# Patient Record
Sex: FEMALE | Race: ASIAN | NOT HISPANIC OR LATINO | URBAN - METROPOLITAN AREA
[De-identification: names, ages, dates, MRNs, and addresses within clinical notes are randomized per-mention and may not be internally consistent; named-entity substitution may affect disease eponyms.]

---

## 2017-08-07 ENCOUNTER — EMERGENCY (EMERGENCY)
Facility: HOSPITAL | Age: 57
LOS: 1 days | Discharge: HOME | End: 2017-08-07
Attending: EMERGENCY MEDICINE | Admitting: EMERGENCY MEDICINE
Payer: COMMERCIAL

## 2017-08-07 VITALS
DIASTOLIC BLOOD PRESSURE: 79 MMHG | SYSTOLIC BLOOD PRESSURE: 137 MMHG | HEART RATE: 85 BPM | TEMPERATURE: 99 F | RESPIRATION RATE: 18 BRPM | OXYGEN SATURATION: 98 %

## 2017-08-07 PROCEDURE — 96372 THER/PROPH/DIAG INJ SC/IM: CPT

## 2017-08-07 PROCEDURE — 99284 EMERGENCY DEPT VISIT MOD MDM: CPT

## 2017-08-07 PROCEDURE — 99283 EMERGENCY DEPT VISIT LOW MDM: CPT | Mod: 25

## 2017-08-07 RX ORDER — KETOROLAC TROMETHAMINE 30 MG/ML
15 SYRINGE (ML) INJECTION ONCE
Qty: 0 | Refills: 0 | Status: DISCONTINUED | OUTPATIENT
Start: 2017-08-07 | End: 2017-08-07

## 2017-08-07 RX ORDER — CYCLOBENZAPRINE HYDROCHLORIDE 10 MG/1
1 TABLET, FILM COATED ORAL
Qty: 21 | Refills: 0 | OUTPATIENT
Start: 2017-08-07 | End: 2017-08-14

## 2017-08-07 RX ADMIN — Medication 15 MILLIGRAM(S): at 22:38

## 2017-08-07 NOTE — ED PROVIDER NOTE - ATTENDING CONTRIBUTION TO CARE
I performed a history and physical exam of the patient and discussed their management with the resident. I reviewed the resident's note and agree with the documented findings and plan of care. My medical decision making and observations are found above.  Lungs clear. abd soft. no zoster lesions.

## 2017-08-07 NOTE — ED PROVIDER NOTE - NS ED ROS FT
ROS: denies weakness, dizziness, fevers/chills, nausea/vomiting, chest pain, SOB, diaphoresis, abdominal pain, diarrhea, neuro deficits, dysuria/hematuria, rash

## 2017-08-07 NOTE — ED ADULT NURSE NOTE - OBJECTIVE STATEMENT
Pt AXOX4, gross neuro intact, coming to ED c/o headache and neck stiffness.  Pt calm, cooperative.  PT denies dizziness, N/V, F/C, bleeding, dark stools, injury/falls.  Pt well appearing.  Will continue to monitor.

## 2017-08-07 NOTE — ED PROVIDER NOTE - MEDICAL DECISION MAKING DETAILS
Delio: hx of cervical stenosis. no trauma, no focal neuro changes. headache not a bleed or infection or mass. tx pain and d/c

## 2017-08-07 NOTE — ED PROVIDER NOTE - PHYSICAL EXAMINATION
Gen: Well appearing, NAD, in mild distress  Head: NCAT. no TTP on temples bilaterally  HEENT: PERRL, MMM, normal conjunctiva, anicteric, neck supple  Lung: CTAB, no adventitious sounds  CV: RRR, no murmurs, rubs or gallops  Abd: soft, NTND, no rebound or guarding, no CVAT  MSK: No edema, no visible deformities  Neuro: No focal neurologic deficits. CN II-XII grossly intact. 5/5 strength and normal sensation in all extremities.  Skin: Warm and dry, 3 puntate rash on L thigh; 1cm x 1cm mild erythema on L shoulder  Psych: normal mood and affect

## 2017-08-07 NOTE — ED PROVIDER NOTE - OBJECTIVE STATEMENT
57yo hx migraine and cervical stenosis presents with HA / neck pain. Started last night, but was able to sleep through the pain. Pain progressed to neck pain today. Does not have HA today. Describes HA as tightness around the head that has now moved on to the tightness to the neck. Has shooting electrical pain starting at her shoulders and traveling upto her occiput. denies recent travel. UTD on vaccinations. Associated with some edematous redness on L shoulder, that started 2 hours ago and is improving. Also has punctate rash that started last night on R leg that was itchy but is no longer itchy

## 2018-06-13 ENCOUNTER — EMERGENCY (EMERGENCY)
Facility: HOSPITAL | Age: 58
LOS: 1 days | Discharge: HOME | End: 2018-06-13
Attending: EMERGENCY MEDICINE
Payer: COMMERCIAL

## 2018-06-13 VITALS
HEART RATE: 67 BPM | WEIGHT: 115.96 LBS | SYSTOLIC BLOOD PRESSURE: 138 MMHG | OXYGEN SATURATION: 99 % | TEMPERATURE: 98 F | DIASTOLIC BLOOD PRESSURE: 86 MMHG | RESPIRATION RATE: 18 BRPM

## 2018-06-13 VITALS
SYSTOLIC BLOOD PRESSURE: 115 MMHG | OXYGEN SATURATION: 96 % | RESPIRATION RATE: 18 BRPM | HEART RATE: 68 BPM | DIASTOLIC BLOOD PRESSURE: 67 MMHG | TEMPERATURE: 98 F

## 2018-06-13 LAB
ALBUMIN SERPL ELPH-MCNC: 5 G/DL — SIGNIFICANT CHANGE UP (ref 3.3–5)
ALP SERPL-CCNC: 98 U/L — SIGNIFICANT CHANGE UP (ref 40–120)
ALT FLD-CCNC: 22 U/L — SIGNIFICANT CHANGE UP (ref 10–45)
ANION GAP SERPL CALC-SCNC: 17 MMOL/L — SIGNIFICANT CHANGE UP (ref 5–17)
APPEARANCE UR: CLEAR — SIGNIFICANT CHANGE UP
AST SERPL-CCNC: 29 U/L — SIGNIFICANT CHANGE UP (ref 10–40)
BACTERIA # UR AUTO: ABNORMAL /HPF
BASOPHILS # BLD AUTO: 0.1 K/UL — SIGNIFICANT CHANGE UP (ref 0–0.2)
BASOPHILS NFR BLD AUTO: 0.9 % — SIGNIFICANT CHANGE UP (ref 0–2)
BILIRUB SERPL-MCNC: 0.5 MG/DL — SIGNIFICANT CHANGE UP (ref 0.2–1.2)
BILIRUB UR-MCNC: NEGATIVE — SIGNIFICANT CHANGE UP
BUN SERPL-MCNC: 24 MG/DL — HIGH (ref 7–23)
CALCIUM SERPL-MCNC: 9.7 MG/DL — SIGNIFICANT CHANGE UP (ref 8.4–10.5)
CHLORIDE SERPL-SCNC: 94 MMOL/L — LOW (ref 96–108)
CO2 SERPL-SCNC: 25 MMOL/L — SIGNIFICANT CHANGE UP (ref 22–31)
COLOR SPEC: YELLOW — SIGNIFICANT CHANGE UP
CREAT SERPL-MCNC: 0.78 MG/DL — SIGNIFICANT CHANGE UP (ref 0.5–1.3)
DIFF PNL FLD: ABNORMAL
EOSINOPHIL # BLD AUTO: 0 K/UL — SIGNIFICANT CHANGE UP (ref 0–0.5)
EOSINOPHIL NFR BLD AUTO: 0.3 % — SIGNIFICANT CHANGE UP (ref 0–6)
EPI CELLS # UR: SIGNIFICANT CHANGE UP /HPF
GLUCOSE SERPL-MCNC: 106 MG/DL — HIGH (ref 70–99)
GLUCOSE UR QL: NEGATIVE — SIGNIFICANT CHANGE UP
HCT VFR BLD CALC: 45.4 % — HIGH (ref 34.5–45)
HGB BLD-MCNC: 15.8 G/DL — HIGH (ref 11.5–15.5)
KETONES UR-MCNC: ABNORMAL
LEUKOCYTE ESTERASE UR-ACNC: ABNORMAL
LYMPHOCYTES # BLD AUTO: 1.8 K/UL — SIGNIFICANT CHANGE UP (ref 1–3.3)
LYMPHOCYTES # BLD AUTO: 15.6 % — SIGNIFICANT CHANGE UP (ref 13–44)
MCHC RBC-ENTMCNC: 31.4 PG — SIGNIFICANT CHANGE UP (ref 27–34)
MCHC RBC-ENTMCNC: 34.8 GM/DL — SIGNIFICANT CHANGE UP (ref 32–36)
MCV RBC AUTO: 90.2 FL — SIGNIFICANT CHANGE UP (ref 80–100)
MONOCYTES # BLD AUTO: 0.8 K/UL — SIGNIFICANT CHANGE UP (ref 0–0.9)
MONOCYTES NFR BLD AUTO: 7.1 % — SIGNIFICANT CHANGE UP (ref 2–14)
NEUTROPHILS # BLD AUTO: 8.7 K/UL — HIGH (ref 1.8–7.4)
NEUTROPHILS NFR BLD AUTO: 76.1 % — SIGNIFICANT CHANGE UP (ref 43–77)
NITRITE UR-MCNC: NEGATIVE — SIGNIFICANT CHANGE UP
PH UR: 6 — SIGNIFICANT CHANGE UP (ref 5–8)
PLATELET # BLD AUTO: 273 K/UL — SIGNIFICANT CHANGE UP (ref 150–400)
POTASSIUM SERPL-MCNC: 4 MMOL/L — SIGNIFICANT CHANGE UP (ref 3.5–5.3)
POTASSIUM SERPL-SCNC: 4 MMOL/L — SIGNIFICANT CHANGE UP (ref 3.5–5.3)
PROT SERPL-MCNC: 8.7 G/DL — HIGH (ref 6–8.3)
PROT UR-MCNC: 30 MG/DL
RBC # BLD: 5.03 M/UL — SIGNIFICANT CHANGE UP (ref 3.8–5.2)
RBC # FLD: 11.5 % — SIGNIFICANT CHANGE UP (ref 10.3–14.5)
RBC CASTS # UR COMP ASSIST: SIGNIFICANT CHANGE UP /HPF (ref 0–2)
SODIUM SERPL-SCNC: 136 MMOL/L — SIGNIFICANT CHANGE UP (ref 135–145)
SP GR SPEC: 1.02 — SIGNIFICANT CHANGE UP (ref 1.01–1.02)
UROBILINOGEN FLD QL: NEGATIVE — SIGNIFICANT CHANGE UP
WBC # BLD: 11.4 K/UL — HIGH (ref 3.8–10.5)
WBC # FLD AUTO: 11.4 K/UL — HIGH (ref 3.8–10.5)
WBC UR QL: SIGNIFICANT CHANGE UP /HPF (ref 0–5)

## 2018-06-13 PROCEDURE — 93010 ELECTROCARDIOGRAM REPORT: CPT

## 2018-06-13 PROCEDURE — 81001 URINALYSIS AUTO W/SCOPE: CPT

## 2018-06-13 PROCEDURE — 83690 ASSAY OF LIPASE: CPT

## 2018-06-13 PROCEDURE — 99284 EMERGENCY DEPT VISIT MOD MDM: CPT | Mod: 25

## 2018-06-13 PROCEDURE — 96375 TX/PRO/DX INJ NEW DRUG ADDON: CPT

## 2018-06-13 PROCEDURE — 93005 ELECTROCARDIOGRAM TRACING: CPT

## 2018-06-13 PROCEDURE — 96374 THER/PROPH/DIAG INJ IV PUSH: CPT

## 2018-06-13 PROCEDURE — 85027 COMPLETE CBC AUTOMATED: CPT

## 2018-06-13 PROCEDURE — 80053 COMPREHEN METABOLIC PANEL: CPT

## 2018-06-13 RX ORDER — PROCHLORPERAZINE MALEATE 5 MG
10 TABLET ORAL ONCE
Qty: 0 | Refills: 0 | Status: COMPLETED | OUTPATIENT
Start: 2018-06-13 | End: 2018-06-13

## 2018-06-13 RX ORDER — SODIUM CHLORIDE 9 MG/ML
1000 INJECTION INTRAMUSCULAR; INTRAVENOUS; SUBCUTANEOUS ONCE
Qty: 0 | Refills: 0 | Status: COMPLETED | OUTPATIENT
Start: 2018-06-13 | End: 2018-06-13

## 2018-06-13 RX ORDER — METOCLOPRAMIDE HCL 10 MG
10 TABLET ORAL ONCE
Qty: 0 | Refills: 0 | Status: COMPLETED | OUTPATIENT
Start: 2018-06-13 | End: 2018-06-13

## 2018-06-13 RX ORDER — METOCLOPRAMIDE HCL 10 MG
5 TABLET ORAL ONCE
Qty: 0 | Refills: 0 | Status: DISCONTINUED | OUTPATIENT
Start: 2018-06-13 | End: 2018-06-13

## 2018-06-13 RX ORDER — ONDANSETRON 8 MG/1
1 TABLET, FILM COATED ORAL
Qty: 9 | Refills: 0 | OUTPATIENT
Start: 2018-06-13 | End: 2018-06-15

## 2018-06-13 RX ADMIN — Medication 104 MILLIGRAM(S): at 11:42

## 2018-06-13 RX ADMIN — SODIUM CHLORIDE 1000 MILLILITER(S): 9 INJECTION INTRAMUSCULAR; INTRAVENOUS; SUBCUTANEOUS at 09:42

## 2018-06-13 RX ADMIN — Medication 10 MILLIGRAM(S): at 09:42

## 2018-06-13 NOTE — ED ADULT NURSE REASSESSMENT NOTE - NS ED NURSE REASSESS COMMENT FT1
pt provided crackers and water for PO challenge; pharmacy contacted for medication to be sent for administration

## 2018-06-13 NOTE — ED PROVIDER NOTE - ATTENDING CONTRIBUTION TO CARE
Dr. Brandon : I have personally seen and examined this patient at the bedside. I have fully participated in the care of this patient. I have reviewed all pertinent clinical information, including history, physical exam, plan and the PA's note and agree except as noted.     58yo F PMhx HTN HLD migraines, anxiety, cervical stenosis, pw n/vx 2 days. states that had a dental cleaning on Monday, with topical anesthetic that she might of swallowed, after which felt nauseous and vomited x 5 (non bilious), noticed some blood tinged vomit after many episodes. unable to keep anything down has been hydrating but vomits water. afraid to eat after that. feels dehydrated a little lightheaded. +ha but notes feels like her migraine ha as unable to keep down her daily migrane meds. gradual onset.   Denies f/cp/sob/palpitations/cough/abd.pain/d/c/dysuria/hematuria. no sick contacts/recent travel. no raw food. notes that had coffee and donut sp dental clinic, no prostetic valves or hardware in the body.    PE:  head; atraumatic normocephalic  eyes: perrla  neck: supple  Heart: rrr s1s2 no murmur  lungs: ctab  abd: soft, nt nd + bs no rebound/guarding no cva ttp  le: no swelling no calf ttp  back: no midline cervical/thoracic/lumbar ttp  neuro: aao x 3 cn ii-xii intact    -->vomiting possible stomach lining irritation sp topical oral anesthetic; no other complaints or symptoms will check basic labs hydrate; reglan, ekg; po challenge--reassess

## 2018-06-13 NOTE — ED PROVIDER NOTE - OBJECTIVE STATEMENT
56 YO female PMhx HTN HLD migraines, anxiety, cervical stenosis, presents to ED c/o nausea/vomiting x 2 days. Pt states began after routine dental cleaning 2 days ago. Pt notes an hour after, felt nausea and vomiting, with 5 episodes of vomiting that day with the last episode with blood tinged vomit. Pt notes the following day, after taking HTN medication, pt experienced vomiting of fluids only and has been unable to keep down liquids or solids. Associated symptoms dizziness and chills last night. She notes headache is typical headache for her similar to previous headaches, usually resolved with fiuranol, but has not taken because of n/v. Headache located posterior head, gradual onset,  does not radiate, is not typically associated with n/v. Denies fever, photophobia, visual changes, first HA, worst HA, neck stiffness, significant trauma, neuro deficits, abdominal pain, constipation/diarrhea, recent travel, recent sick contacts, urinary symptoms. 58 YO female post menopausal PMhx HTN HLD migraines, anxiety, cervical stenosis, presents to ED c/o nausea/vomiting x 2 days. Pt states began after routine dental cleaning 2 days ago. Pt notes an hour after, felt nausea and vomiting, with 5 episodes of vomiting that day with the last episode with blood tinged vomit. Pt notes the following day, after taking HTN medication, pt experienced vomiting of fluids only and has been unable to keep down liquids or solids. Experiences waves of nausea, intermittent throughout day. Associated symptoms dizziness and chills last night. She notes headache is typical headache for her similar to previous headaches, gets daily headaches, usually resolved with fiuranol, but has not taken because of n/v. Headache located posterior head, gradual onset,  does not radiate, is not typically associated with n/v. Denies fever, photophobia, visual changes, first HA, worst HA, neck stiffness, significant trauma, neuro deficits, abdominal pain, constipation/diarrhea, recent travel, recent sick contacts, urinary symptoms.    PMD Addison Ruby

## 2018-06-13 NOTE — ED ADULT NURSE NOTE - OBJECTIVE STATEMENT
56 y/o female a+ox3, pmhx migraines, HTN, HLD, coming from home for headache with n/v x few days. Pt reports onset of generalized headache with progressively worsening nausea and vomiting with chills; pt unable to keep fluids and PO meds down as she would "vomit soon after". Pt confirms history of migraines but they "never come with nausea and vomiting." Pt denies visual changes, photophobia, neck pain or stiffness, fevers, recent falls or head trauma, chest pain or discomfort, difficulty breathing, abdominal pain, diarrhea, numbness or tingling to the extremities. IV established, labs obtained. Pt left in position of comfort, socks provided. Will reassess

## 2018-06-13 NOTE — ED PROVIDER NOTE - PROGRESS NOTE DETAILS
Stable. PT seen and reassessed.  Patient symptomatically improved with fluids, reglan and compazine.  Pt PO challenge with fluids and crackers, No episodes of nausea/vomiting in ED stay.  AAOX3, NAD, VSS.  Discussed test results w/ patient, given copy of results. UA unconvincing, Urine culture sent, pt not currently experiencing urinary symptoms. Patient verbalized understanding of hospital course and outpatient plans, has decisional making capacity.  Will follow-up with Primary care doctor in the next 5-7 days; patient will call for an appointment. Will return to the ED if there is any worsening of symptoms.  Patient able to ambulate w/o difficulty, is tolerating PO intake. -Purvi Cardenas PA-C Stable. PT seen and reassessed.  Patient symptomatically improved with fluids, reglan and compazine.  Pt PO challenge with fluids and crackers, No episodes of nausea/vomiting in ED stay.  AAOX3, NAD, VSS.  Discussed test results w/ patient, given copy of results. UA unconvincing, Urine culture sent, pt not currently experiencing urinary symptoms, pt would like to wait for urine culture results,  to be treated if necessary. Patient verbalized understanding of hospital course and outpatient plans, has decisional making capacity.  Will follow-up with Primary care doctor in the next 5-7 days; patient will call for an appointment. Will return to the ED if there is any worsening of symptoms.  Patient able to ambulate w/o difficulty, is tolerating PO intake. -Purvi Cardenas PA-C

## 2018-06-13 NOTE — ED PROVIDER NOTE - PLAN OF CARE
1. Stay hydrated. Take Zofran 4mg every 8 hours as needed.   2. Follow up with your PMD within 48-72 hrs.  Take all of your medications as previously prescribed.  3. Return to ED if fever, chills, worsening nausea/vomiting uncontrolled, or any other concerning symptoms.

## 2018-06-13 NOTE — ED PROVIDER NOTE - CARE PLAN
Principal Discharge DX:	Nausea & vomiting  Assessment and plan of treatment:	1. Stay hydrated. Take Zofran 4mg every 8 hours as needed.   2. Follow up with your PMD within 48-72 hrs.  Take all of your medications as previously prescribed.  3. Return to ED if fever, chills, worsening nausea/vomiting uncontrolled, or any other concerning symptoms.

## 2018-06-13 NOTE — ED ADULT NURSE NOTE - CHPI ED SYMPTOMS NEG
no blurred vision/no fever/no change in level of consciousness/no loss of consciousness/no confusion

## 2025-01-09 ENCOUNTER — EMERGENCY (EMERGENCY)
Facility: HOSPITAL | Age: 65
LOS: 1 days | Discharge: LEFT BEFORE DISCHARGE | End: 2025-01-09
Attending: STUDENT IN AN ORGANIZED HEALTH CARE EDUCATION/TRAINING PROGRAM
Payer: MEDICARE

## 2025-01-09 VITALS
DIASTOLIC BLOOD PRESSURE: 80 MMHG | OXYGEN SATURATION: 98 % | SYSTOLIC BLOOD PRESSURE: 177 MMHG | HEART RATE: 81 BPM | RESPIRATION RATE: 16 BRPM

## 2025-01-09 VITALS
HEIGHT: 60 IN | RESPIRATION RATE: 18 BRPM | OXYGEN SATURATION: 96 % | DIASTOLIC BLOOD PRESSURE: 88 MMHG | SYSTOLIC BLOOD PRESSURE: 163 MMHG | HEART RATE: 83 BPM | TEMPERATURE: 98 F | WEIGHT: 119.93 LBS

## 2025-01-09 DIAGNOSIS — R04.0 EPISTAXIS: ICD-10-CM

## 2025-01-09 PROBLEM — E78.00 PURE HYPERCHOLESTEROLEMIA, UNSPECIFIED: Chronic | Status: ACTIVE | Noted: 2017-08-07

## 2025-01-09 LAB
ALBUMIN SERPL ELPH-MCNC: 4.1 G/DL — SIGNIFICANT CHANGE UP (ref 3.3–5)
ALP SERPL-CCNC: 148 U/L — HIGH (ref 40–120)
ALT FLD-CCNC: 32 U/L — SIGNIFICANT CHANGE UP (ref 10–45)
ANION GAP SERPL CALC-SCNC: 16 MMOL/L — SIGNIFICANT CHANGE UP (ref 5–17)
AST SERPL-CCNC: 23 U/L — SIGNIFICANT CHANGE UP (ref 10–40)
BASOPHILS # BLD AUTO: 0.06 K/UL — SIGNIFICANT CHANGE UP (ref 0–0.2)
BASOPHILS NFR BLD AUTO: 0.5 % — SIGNIFICANT CHANGE UP (ref 0–2)
BILIRUB SERPL-MCNC: 0.5 MG/DL — SIGNIFICANT CHANGE UP (ref 0.2–1.2)
BUN SERPL-MCNC: 19 MG/DL — SIGNIFICANT CHANGE UP (ref 7–23)
CALCIUM SERPL-MCNC: 9.8 MG/DL — SIGNIFICANT CHANGE UP (ref 8.4–10.5)
CHLORIDE SERPL-SCNC: 100 MMOL/L — SIGNIFICANT CHANGE UP (ref 96–108)
CO2 SERPL-SCNC: 22 MMOL/L — SIGNIFICANT CHANGE UP (ref 22–31)
CREAT SERPL-MCNC: 0.74 MG/DL — SIGNIFICANT CHANGE UP (ref 0.5–1.3)
EGFR: 90 ML/MIN/1.73M2 — SIGNIFICANT CHANGE UP
EGFR: 90 ML/MIN/1.73M2 — SIGNIFICANT CHANGE UP
EOSINOPHIL # BLD AUTO: 0.07 K/UL — SIGNIFICANT CHANGE UP (ref 0–0.5)
EOSINOPHIL NFR BLD AUTO: 0.5 % — SIGNIFICANT CHANGE UP (ref 0–6)
GLUCOSE SERPL-MCNC: 113 MG/DL — HIGH (ref 70–99)
HCT VFR BLD CALC: 40.5 % — SIGNIFICANT CHANGE UP (ref 34.5–45)
HGB BLD-MCNC: 13.4 G/DL — SIGNIFICANT CHANGE UP (ref 11.5–15.5)
IMM GRANULOCYTES NFR BLD AUTO: 0.4 % — SIGNIFICANT CHANGE UP (ref 0–0.9)
LYMPHOCYTES # BLD AUTO: 16 % — SIGNIFICANT CHANGE UP (ref 13–44)
LYMPHOCYTES # BLD AUTO: 2.11 K/UL — SIGNIFICANT CHANGE UP (ref 1–3.3)
MCHC RBC-ENTMCNC: 29.9 PG — SIGNIFICANT CHANGE UP (ref 27–34)
MCHC RBC-ENTMCNC: 33.1 G/DL — SIGNIFICANT CHANGE UP (ref 32–36)
MCV RBC AUTO: 90.4 FL — SIGNIFICANT CHANGE UP (ref 80–100)
MONOCYTES # BLD AUTO: 0.85 K/UL — SIGNIFICANT CHANGE UP (ref 0–0.9)
MONOCYTES NFR BLD AUTO: 6.4 % — SIGNIFICANT CHANGE UP (ref 2–14)
NEUTROPHILS # BLD AUTO: 10.07 K/UL — HIGH (ref 1.8–7.4)
NEUTROPHILS NFR BLD AUTO: 76.2 % — SIGNIFICANT CHANGE UP (ref 43–77)
NRBC # BLD: 0 /100 WBCS — SIGNIFICANT CHANGE UP (ref 0–0)
NRBC BLD-RTO: 0 /100 WBCS — SIGNIFICANT CHANGE UP (ref 0–0)
PLATELET # BLD AUTO: 248 K/UL — SIGNIFICANT CHANGE UP (ref 150–400)
POTASSIUM SERPL-MCNC: 4.3 MMOL/L — SIGNIFICANT CHANGE UP (ref 3.5–5.3)
POTASSIUM SERPL-SCNC: 4.3 MMOL/L — SIGNIFICANT CHANGE UP (ref 3.5–5.3)
PROT SERPL-MCNC: 7.5 G/DL — SIGNIFICANT CHANGE UP (ref 6–8.3)
RBC # BLD: 4.48 M/UL — SIGNIFICANT CHANGE UP (ref 3.8–5.2)
RBC # FLD: 12.1 % — SIGNIFICANT CHANGE UP (ref 10.3–14.5)
SODIUM SERPL-SCNC: 138 MMOL/L — SIGNIFICANT CHANGE UP (ref 135–145)
WBC # BLD: 13.21 K/UL — HIGH (ref 3.8–10.5)
WBC # FLD AUTO: 13.21 K/UL — HIGH (ref 3.8–10.5)

## 2025-01-09 PROCEDURE — 80053 COMPREHEN METABOLIC PANEL: CPT

## 2025-01-09 PROCEDURE — 70486 CT MAXILLOFACIAL W/O DYE: CPT | Mod: 26

## 2025-01-09 PROCEDURE — 36415 COLL VENOUS BLD VENIPUNCTURE: CPT

## 2025-01-09 PROCEDURE — 99284 EMERGENCY DEPT VISIT MOD MDM: CPT | Mod: 25

## 2025-01-09 PROCEDURE — 70486 CT MAXILLOFACIAL W/O DYE: CPT | Mod: MC

## 2025-01-09 PROCEDURE — 85025 COMPLETE CBC W/AUTO DIFF WBC: CPT

## 2025-01-09 PROCEDURE — 99284 EMERGENCY DEPT VISIT MOD MDM: CPT | Mod: GC

## 2025-01-09 RX ORDER — ACETAMINOPHEN 500 MG/5ML
975 LIQUID (ML) ORAL ONCE
Refills: 0 | Status: COMPLETED | OUTPATIENT
Start: 2025-01-09 | End: 2025-01-09

## 2025-01-09 RX ORDER — IBUPROFEN 200 MG
600 TABLET ORAL ONCE
Refills: 0 | Status: COMPLETED | OUTPATIENT
Start: 2025-01-09 | End: 2025-01-09

## 2025-01-09 RX ADMIN — Medication 600 MILLIGRAM(S): at 07:54

## 2025-01-09 RX ADMIN — Medication 975 MILLIGRAM(S): at 05:11
